# Patient Record
Sex: FEMALE | Race: WHITE | Employment: OTHER | URBAN - METROPOLITAN AREA
[De-identification: names, ages, dates, MRNs, and addresses within clinical notes are randomized per-mention and may not be internally consistent; named-entity substitution may affect disease eponyms.]

---

## 2018-04-13 ENCOUNTER — PATIENT OUTREACH (OUTPATIENT)
Dept: OTHER | Age: 58
End: 2018-04-13

## 2018-04-13 NOTE — PROGRESS NOTES
[facility]  Outreach      8:30am  Patient on report as eligible for Case Management. Left discreet message on voicemail with this CM contact information. Will attempt to contact again to offer [address] [place] [address] Management services. * Noted elevated BS ( prednisone current med)   * ED visit 4/12 for low grade fever and sinus tachycardia.     3:00pm  No return call. Will attempt contact Monday 4/16. Chart Review:   ED visit low grade fever and sinus tachycardia (c/o palpitations)    ED Prescriptions   Medication Sig Dispense Start Date End Date Auth. Provider   azithromycin (ZITHROMAX TRI-MEENAKSHI) 500 mg tab Take 1 Tab by mouth daily for 3 days. 3 Tab 4/12/2018 4/15/2018 Tabitha Martin MD   Follow-up Information   Follow up With Details Comments Contact Info   John Giron MD In 2 days  [address] EMERGENCY DEPT  If symptoms worsen [address] [facility]   [phone]     METABOLIC PANEL, COMPREHENSIVE [[id number]] (Abnormal) Collected: 04/12/18 1535   Order Status: Completed Specimen: Serum from Plasma Updated: 04/12/18 1623    Sodium 138 mmol/L     Potassium 3.4 (L) mmol/L     Chloride 101 mmol/L     CO2 30 mmol/L     Anion gap 7 (L) mmol/L     Glucose 207 (H) mg/dL     BUN 13 mg/dL     Creatinine 0.95 mg/dL     GFR est AA >60 ml/min/1.73m2     GFR est non-AA >60 ml/min/1.73m2      (NOTE)         Prior to Admission medications    Medication Sig Start Date End Date Taking? Authorizing Provider   aspirin (ASPIRIN) 325 mg tablet Take 325 mg by mouth daily.     Yes Vishal Middleton MD   dilTIAZem (TIAZAC) 360 mg ER capsule TAKE 1 CAPSULE DAILY 3/14/18   Yes Elayne Isidro MD   flecainide (TAMBOCOR) 100 mg tablet Take 1 Tab by mouth two (2) times a day.  11/17/17   Yes Elayne Isidro MD   bumetanide (BUMEX) 1 mg tablet Take  by mouth daily.     Yes Historical Provider   omeprazole (PRILOSEC) 20 mg capsule Take 20 mg by mouth daily.     Yes Phys Other, MD   predniSONE (DELTASONE) 20 mg tablet Take 10 mg by mouth daily (with breakfast).     Yes Phys Other, MD   oxyCODONE-acetaminophen (PERCOCET) 5-325 mg per tablet 1 Tab Oral every 4 - 6 hours with food as needed for pain 5/9/16     Rickey Haas MD   beclomethasone (QVAR) 80 mcg/actuation inhaler Take 1 Puff by inhalation two (2) times a day.       Phys MD Kane

## 2018-04-17 ENCOUNTER — PATIENT OUTREACH (OUTPATIENT)
Dept: OTHER | Age: 58
End: 2018-04-17

## 2018-04-17 NOTE — LETTER
4/17/2018 11:56 AM 
 
Ms. Art Blood 
[address] [facility] [address] [address] [address] Dear Ms. Felder, My name is Kalani Davis, Employee Care Manager for [employer] and I enjoyed speaking with you today. As promised, here is a letter with a few business cards and the brochure about services that may help you! And as discussed - I can be flexable on when calls are planned! The Employee Care Management [facility]) program is a free-of-charge confidential service provided to our employees and their family members covered by the [facility]. The program will provide an employee and his/her family with the [employer] expertise to assist in navigating the health care delivery system, provider services, and their overall care needsso as to assure and improve health care interactions and enhance the quality of life. This program is designed to provide you with the opportunity to have a [employer] care manager partner with you for the following services: 
 1) when you come home from the hospital or emergency room 2) when help is needed to manage your disease 3) when you need assistance coordinating services or appointments [facility] now partners with [facility]. If you are a qualifying employee, you may receive an additional 10 wellness incentive points for every month of active participation with an Employee Care Manager. [employer] is dedicated to empowering the good health of its community and improving the quality of care and care experiences for employees and their families. We are committed to safeguarding patient confidentiality and privacy, assuring that every employee has the respect he or she deserves in managing their health.  The information shared with your care manager will not be shared with anyone else aside from those you identify as part of your care team, and will only be used to assist you with any identified care needs. Please contact me if you would like this service provided to you. I'll plan a follow up call to you in about a month to see if you are interested.    
 
Sincerely, 
 
 
 
 
MORA Lemus RN, [facility], [address] [address].  
Phone:  [phone]     Fax:  [phone]    [email or web address]

## 2018-04-17 NOTE — PROGRESS NOTES
CCM  Outreach      11:45 am  Patient identified as eligible for [address] [place] [address] Management services. Second telephone outreach . * Noted elevated BS ( prednisone current med)   * ED visit  for low grade fever and sinus tachycardia. * Ms. Bell January answered. Verified  and address for HIPAA security.   - She reports that she attended FU visit with PCP yesterday. - Discussed high BS - this was reviewed at PCP apt. He attributes to prednisone use. - She feels better today. - No med rec or assessment done. Ms. Ray Marx has not consented to CM services at this time. * CM services offered through [address] [place] [address]. - Discussed Care Coordination needs/  Lifestyle management/ any treatment goals she has with physician.      - Ms. Felder would like to think about this. She is very busy and does not have much time for calls. She is comfortable with her treatment team and does not see any CM needs. * I will send brochure and letter with my contact information for her to consider and call again in one month. FU call: One month.

## 2018-05-17 ENCOUNTER — PATIENT OUTREACH (OUTPATIENT)
Dept: OTHER | Age: 58
End: 2018-05-17

## 2018-05-17 NOTE — PROGRESS NOTES
CCM outreach:      12:45pm  Called patient for Care Management for her possible enrollment. Left discreet voice mail with my contact information encourage a call back. * Sent brochure/ my business card and letter one month ago. FU in one week if no return call.

## 2018-05-24 ENCOUNTER — PATIENT OUTREACH (OUTPATIENT)
Dept: OTHER | Age: 58
End: 2018-05-24

## 2018-05-24 NOTE — PROGRESS NOTES
CCM outreach      Verified  and address for HIPAA security. Introduced [employer] for patient. Patient does not identify any Care Management needs at this time and declines services. - Reviewed CM brochure and thinks that she has a good care team for her conditions.

## 2018-07-06 ENCOUNTER — PATIENT OUTREACH (OUTPATIENT)
Dept: OTHER | Age: 58
End: 2018-07-06

## 2018-07-06 NOTE — PROGRESS NOTES
DEEPA Call:      Patient on report as with discharge from ED visit 7/3-  Transferred to facility [facility]. RRAT score for inpatient stay:  NA    Initial attempt to contact patient for transitions of care. Left discreet message on voicemail with this CM contact information. * Patient previously declined CCM/ will outreach for RUBY MCCALLUM needs. Will attempt to contact again. Chart Review:     ED with transfer:  7/3  Jas Harvey RN        Pt is now currently accepted at [facility]  And they are arranging an ambulance for transport         HPI Comments: 60 yo female c/o R dental pain/swelling to face worsening over the last 4 days. She was seen in ED Sat, incision attempted and treated with Levaquin (due to allergies to PCN and Cipro). No f/c. Worsening (+) nausea and dry heaves today. Unable to take medication by mouth. No HA, neck pain, difficulty breathing. No tongue or throat swelling. Difficulty fully opening mouth. Could not get to Dental appt today due to illness. Patient on prednisone. 7/3/2018 12:07 PM - [facility]   Component Results   Component Value Flag Ref Range Units Status   WBC 10.6  4.8 - 10.8 K/uL Final   RBC 4.55  4.20 - 5.40 M/uL Final   HGB 14.3  12.0 - 16.0 g/dL Final   HCT 42.0  37.0 - 47.0 % Final   MCV 92.3  81.0 - 100.0 FL Final   MCH 31.4 (H) 27.0 - 31.0 PG Final   MCHC 34.0  30.5 - 36.0 g/dL Final   RDW 13.1  11.4 - 14.6 % Final   PLATELET 629  619 - 838 K/uL Final   MPV 11.4  10.2 - 12.7 FL Final   NEUTROPHILS 73  42 - 75 % Final   BAND NEUTROPHILS 4  0 - 7 % Final   LYMPHOCYTES 11 (L) 21 - 51 % Final   ATYPICAL LYMPHS 1   % Final   MONOCYTES 11 (H) 2 - 9 % Final   ABS.  NEUTROPHILS 8.1   K/UL Final

## 2018-07-09 ENCOUNTER — PATIENT OUTREACH (OUTPATIENT)
Dept: OTHER | Age: 58
End: 2018-07-09

## 2018-07-09 NOTE — PROGRESS NOTES
DEEPA 2nd call:      Patient on report as with Transfx from ED visit 7/3 to [facility] hospital stay. RRAT score for inpatient stay:  Unavailable. Second attempt to contact patient for transitions of care. Left discreet message on voicemail with this CM contact information. * Patient has declined CM.  5/24/2018. Unable to reach letter sent via My Chart/  Mail  Will follow for one month for transitions of care needs.

## 2018-07-09 NOTE — LETTER
7/9/2018 10:00 AM 
 
Ms. Art Blood 
[address] [place] [address] Drive [address] Dear Ms Carpenterjuanjo Hamm, My name is Vu Wilson , Employee Care Manager for [employer], and I have been trying to reach you. The Employee Care  is a free-of-charge, confidential service provided to our employees and their family members covered by the [employer]. Part of my job is to follow up with members who have recently been in the hospital or emergency room, to help them coordinate their care and answer questions they may have about their visit. I am able to provide assistance with medication questions, scheduling needed follow-up appointments, and arranging services like home health or home medical equipment. I can also provide education regarding your hospital or ER visit as well as your medical conditions. As healthcare providers, we know that patients do better when they have close follow up with a primary care provider (PCP), especially after a hospital or emergency department visit. If you do not have a PCP, I can help you find one that is convenient to you and covered by your insurance. I can also help you understand any after visit instructions, such as what symptoms to watch out for, or any new or changed medications. Remember that you can access your After Visit Summary by logging into your [employer] account. If you do not have a [employer] account, I can help you request access. Our program is designed to provide you with the opportunity to have a [employer] care manager partner with you for your healthcare needs. Please contact me at the below number if I can provide you with assistance for any of the above services.  
 
 
Sincerely, 
 
 
 
 
 
MORA Michaels RN, [place] [address], [address] [address].  
Phone:  [phone]     Fax:  [phone]    [email or web address]

## 2018-08-03 ENCOUNTER — PATIENT OUTREACH (OUTPATIENT)
Dept: OTHER | Age: 58
End: 2018-08-03

## 2018-08-03 NOTE — PROGRESS NOTES
DEEPA  Wrap Up Resolving current episode (Transitions of care complete). No further ED/UC or hospital admissions within 30 days post discharge. No contact with this patient to confirm any follow up appointment. None found in Connect care. Final attempt to contact patient for transitions of care. Left discreet message on voicemail with this CM contact information. Three phone attempts and a letter sent encouraging contact with CM. No outreach from patient to [address] [place] [address]. Patient previously declined CM services.

## 2019-02-18 ENCOUNTER — DOCUMENTATION ONLY (OUTPATIENT)
Dept: OTHER | Age: 59
End: 2019-02-18

## 2019-02-18 NOTE — PROGRESS NOTES
Documentation review only.  /   ECM      Notified that patient was seen in ED. C/o cystitis with no UTI per clinical results. Patient previously declined CM and was UTR with last episode. No outreach - No CM needs identified.           Chart Review:      ED Prescriptions     None   Follow-up Information     Follow up With Specialties Details Why Contact Info   Ronal Arriaga MD Internal Medicine   [place] [id number] [phone]

## 2021-08-03 PROBLEM — I48.91 ATRIAL FIBRILLATION (HCC): Status: RESOLVED | Noted: 2021-08-03 | Resolved: 2021-08-03
